# Patient Record
Sex: MALE | Race: WHITE | NOT HISPANIC OR LATINO | Employment: OTHER | URBAN - METROPOLITAN AREA
[De-identification: names, ages, dates, MRNs, and addresses within clinical notes are randomized per-mention and may not be internally consistent; named-entity substitution may affect disease eponyms.]

---

## 2023-11-03 PROBLEM — L03.114 CELLULITIS OF HAND, LEFT: Status: ACTIVE | Noted: 2023-11-03

## 2023-11-03 PROBLEM — R94.120 ABNORMALLY COMPLIANT RIGHT MIDDLE EAR SYSTEM WITH TYPE AD TYMPANOGRAM CURVE: Status: ACTIVE | Noted: 2023-11-03

## 2023-11-06 ENCOUNTER — OFFICE VISIT (OUTPATIENT)
Dept: ORTHOPEDIC SURGERY | Facility: CLINIC | Age: 83
End: 2023-11-06
Payer: MEDICARE

## 2023-11-06 DIAGNOSIS — G56.01 CARPAL TUNNEL SYNDROME ON RIGHT: Primary | ICD-10-CM

## 2023-11-06 PROCEDURE — 99213 OFFICE O/P EST LOW 20 MIN: CPT | Performed by: ORTHOPAEDIC SURGERY

## 2023-11-06 PROCEDURE — 1036F TOBACCO NON-USER: CPT | Performed by: ORTHOPAEDIC SURGERY

## 2023-11-06 PROCEDURE — 1159F MED LIST DOCD IN RCRD: CPT | Performed by: ORTHOPAEDIC SURGERY

## 2023-11-06 PROCEDURE — 1126F AMNT PAIN NOTED NONE PRSNT: CPT | Performed by: ORTHOPAEDIC SURGERY

## 2023-11-06 PROCEDURE — 1160F RVW MEDS BY RX/DR IN RCRD: CPT | Performed by: ORTHOPAEDIC SURGERY

## 2023-11-06 RX ORDER — SODIUM CHLORIDE, SODIUM LACTATE, POTASSIUM CHLORIDE, CALCIUM CHLORIDE 600; 310; 30; 20 MG/100ML; MG/100ML; MG/100ML; MG/100ML
100 INJECTION, SOLUTION INTRAVENOUS CONTINUOUS
Status: CANCELLED | OUTPATIENT
Start: 2023-11-06

## 2023-11-06 ASSESSMENT — PAIN - FUNCTIONAL ASSESSMENT: PAIN_FUNCTIONAL_ASSESSMENT: 0-10

## 2023-11-06 ASSESSMENT — PAIN SCALES - GENERAL: PAINLEVEL_OUTOF10: 0 - NO PAIN

## 2023-11-06 ASSESSMENT — ENCOUNTER SYMPTOMS
WHEEZING: 0
SINUS PAIN: 1
FATIGUE: 0
FEVER: 0
CHILLS: 0
SHORTNESS OF BREATH: 0

## 2023-11-06 NOTE — PROGRESS NOTES
Reason for Appointment  Right hand numbness and pain     History of Present Illness  Patient is a 83 y.o. male here today for follow-up evaluation of increased right wrist and hand pain and numbness.  He has a history of a previous left carpal tunnel release earlier this year, this did help him with pain and tingling up the arm, he still does have numbness in the fingertips and he does understand he can continue to see improvement for over a year postoperatively.  He has similar symptoms in the right hand, numbness in the fingertips and pain that radiates up the arm especially at night.  He has had previous carpal tunnel injections that have given him good relief and he understands this is the best prognostic indicator that surgery would help him.  He would like to discuss right carpal tunnel release today.  No other changes in his past medical history, allergies, or medications.    Past Medical History:   Diagnosis Date    Headache, unspecified 10/19/2015    Sinus headache    Other specified disorders of nose and nasal sinuses 09/14/2016    Nasal vestibulitis    Personal history of other diseases of the respiratory system 10/19/2015    History of acute sinusitis       History reviewed. No pertinent surgical history.    Medication Documentation Review Audit       Reviewed by Ivana Isbell PA-C (Physician Assistant) on 11/06/23 at 0918      Medication Order Taking? Sig Documenting Provider Last Dose Status   atorvastatin calcium (ATORVASTATIN ORAL) [id number]  Atorvastatin Calcium Historical Provider, MD  Active   LISINOPRIL ORAL [id number]  Lisinopril Historical Provider, MD  Active                    Allergies   Allergen Reactions    Penicillins Itching and Unknown       Review of Systems   Constitutional:  Negative for chills, fatigue and fever.   HENT:  Positive for sinus pain. Negative for tinnitus.    Respiratory:  Negative for shortness of breath and wheezing.    Cardiovascular:  Negative for chest pain and  leg swelling.     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  Minimally positive Tinel's over the right median nerve, well-healed previous carpal tunnel scar on the left.  Good digital motion today with no triggering.  Good pulses and decreased sensation to light touch in the median nerve distribution, good capillary refill in the upper extremities.    Assessment   Encounter Diagnosis   Name Primary?    Carpal tunnel syndrome on right Yes       Plan   We discussed a right carpal tunnel release today, he has classic symptoms, he has had previous injections that have given him short-term relief and he does have a wrist brace that he wears at night.  He has seen improvement with a previous left carpal tunnel release.  He understands the risks of surgery including nerve, artery, tendon damage, infection, continued pain, and need for future surgery.  He will call and schedule a right carpal tunnel release at his convenience    Written by Ivana Reis saw, evaluated, and treated the patient with the PA

## 2023-11-07 ENCOUNTER — TELEPHONE (OUTPATIENT)
Dept: ORTHOPEDIC SURGERY | Facility: CLINIC | Age: 83
End: 2023-11-07
Payer: MEDICARE

## 2023-11-09 PROBLEM — G56.01 CARPAL TUNNEL SYNDROME ON RIGHT: Status: ACTIVE | Noted: 2023-11-06

## 2023-11-22 ENCOUNTER — APPOINTMENT (OUTPATIENT)
Dept: PREADMISSION TESTING | Facility: HOSPITAL | Age: 83
End: 2023-11-22
Payer: MEDICARE

## 2023-11-24 ENCOUNTER — APPOINTMENT (OUTPATIENT)
Dept: PREADMISSION TESTING | Facility: HOSPITAL | Age: 83
End: 2023-11-24
Payer: MEDICARE

## 2023-11-27 ENCOUNTER — ANESTHESIA EVENT (OUTPATIENT)
Dept: OPERATING ROOM | Facility: HOSPITAL | Age: 83
End: 2023-11-27
Payer: MEDICARE

## 2023-11-27 ENCOUNTER — PRE-ADMISSION TESTING (OUTPATIENT)
Dept: PREADMISSION TESTING | Facility: HOSPITAL | Age: 83
End: 2023-11-27
Payer: MEDICARE

## 2023-11-27 VITALS
TEMPERATURE: 97.9 F | DIASTOLIC BLOOD PRESSURE: 61 MMHG | WEIGHT: 194.45 LBS | RESPIRATION RATE: 16 BRPM | HEART RATE: 76 BPM | BODY MASS INDEX: 29.47 KG/M2 | SYSTOLIC BLOOD PRESSURE: 128 MMHG | HEIGHT: 68 IN | OXYGEN SATURATION: 95 %

## 2023-11-27 RX ORDER — IBUPROFEN 200 MG
400 TABLET ORAL EVERY 6 HOURS PRN
COMMUNITY

## 2023-11-27 RX ORDER — LISINOPRIL AND HYDROCHLOROTHIAZIDE 10; 12.5 MG/1; MG/1
1 TABLET ORAL 2 TIMES DAILY
COMMUNITY

## 2023-11-27 RX ORDER — ATORVASTATIN CALCIUM 80 MG/1
80 TABLET, FILM COATED ORAL DAILY
COMMUNITY

## 2023-11-27 RX ORDER — BISMUTH SUBSALICYLATE 262 MG
1 TABLET,CHEWABLE ORAL DAILY
COMMUNITY

## 2023-11-27 ASSESSMENT — ENCOUNTER SYMPTOMS: NUMBNESS: 1

## 2023-11-27 ASSESSMENT — PAIN - FUNCTIONAL ASSESSMENT: PAIN_FUNCTIONAL_ASSESSMENT: 0-10

## 2023-11-27 ASSESSMENT — PAIN SCALES - GENERAL: PAINLEVEL_OUTOF10: 0 - NO PAIN

## 2023-11-27 NOTE — H&P (VIEW-ONLY)
CPM/PAT Evaluation     Johnny Hunt is a 83 y.o. male   Chief Complaint: carpal tunnel    HPI:  Patient is an 84 y/o alert and oriented male coming in for PAT for a scheduled Right Decompression Median Nerve w/ carpal tunnel release on 11/29/23 w/ Dr. Reis.  The patient reports numbness and tingling in his right hand.  Denies any weakness in his hand but states he does drop objects at times.  Reports he had left carpal tunnel release in the past.  Patient denies chest pain, SOB, VIRAMONTES and NVDC.  Patient also denies Hx: DVT/PE.  Current medications were reviewed and a presurgical mediation schedule was provided.  He has no questions at this time.   Past Medical History:   Diagnosis Date    Headache, unspecified 10/19/2015    Sinus headache    Other specified disorders of nose and nasal sinuses 09/14/2016    Nasal vestibulitis    Personal history of other diseases of the respiratory system 10/19/2015    History of acute sinusitis      History reviewed. No pertinent surgical history.     Allergies   Allergen Reactions    Penicillins Itching and Unknown        Current Outpatient Medications on File Prior to Visit   Medication Sig Dispense Refill    atorvastatin (Lipitor) 80 mg tablet Take 1 tablet (80 mg) by mouth once daily.      ibuprofen 200 mg tablet Take 2 tablets (400 mg) by mouth every 6 hours if needed for mild pain (1 - 3).      lisinopriL-hydrochlorothiazide 10-12.5 mg tablet Take 1 tablet by mouth 2 times a day.      multivitamin tablet Take 1 tablet by mouth once daily.      LISINOPRIL ORAL Lisinopril      [DISCONTINUED] atorvastatin calcium (ATORVASTATIN ORAL) Atorvastatin Calcium       No current facility-administered medications on file prior to visit.       Vitals:    11/27/23 1503   BP: 128/61   Pulse: 76   Resp: 16   Temp: 36.6 °C (97.9 °F)   SpO2: 95%       Review of Systems   Neurological:  Positive for numbness.        See hpi for details.    All other systems reviewed and are negative.      Physical Exam  Vitals and nursing note reviewed.   Constitutional:       Appearance: Normal appearance.   HENT:      Head: Normocephalic and atraumatic.      Mouth/Throat:      Mouth: Mucous membranes are moist.      Pharynx: Oropharynx is clear.   Eyes:      Pupils: Pupils are equal, round, and reactive to light.   Cardiovascular:      Rate and Rhythm: Normal rate and regular rhythm.      Heart sounds: Normal heart sounds.      Comments: EKG today is NSR rate of 73  Pulmonary:      Effort: Pulmonary effort is normal.      Breath sounds: Normal breath sounds.   Musculoskeletal:         General: Normal range of motion.      Cervical back: Normal range of motion and neck supple.   Skin:     General: Skin is warm and dry.   Neurological:      Mental Status: He is alert and oriented to person, place, and time.   Psychiatric:         Mood and Affect: Mood normal.         Behavior: Behavior normal.         Thought Content: Thought content normal.         Judgment: Judgment normal.        PAT AIRWAY:   Airway:     Mallampati::  I    TM distance::  >3 FB    Neck ROM::  Full  Has 2 missing teeth     Assessment and Plan:   Carpal Tunnel Syndrome on Right  Right Decompression Median Nerve w/ Carpal Tunnel Release  Managed with motrin prn    Hypertension  Managed with lisinopril-hydrochlorothiazide 10-12.5mg daily    ASA II  RCRI - 0 points  Class I Risk 3.9%  NAZIA -5  points high  Risk for RYNE   NSQIP - Predicted length of stay0  days  ARISCAT - 24 points Low Risk 1.6%  DASI 42.7 Points 7.99 Mets  [place] - 0.2%  JHFRAT - 6 points low risk for falls  Clearance - not indicated  PAT Testing -EKG  CBC and CMP on 10/16/23 stable     Face to Face patient contact time 20 minutes    GRIFFIN Chadwick-CNP 11/27/2023 3:12 PM

## 2023-11-27 NOTE — PREPROCEDURE INSTRUCTIONS
Medication List            Accurate as of November 27, 2023  3:15 PM. Always use your most recent med list.                atorvastatin 80 mg tablet  Commonly known as: Lipitor  Medication Adjustments for Surgery: Take morning of surgery with sip of water, no other fluids     ibuprofen 200 mg tablet  Medication Adjustments for Surgery: Other (Comment)  Notes to patient: Stop 5 days prior to surgery     lisinopriL-hydrochlorothiazide 10-12.5 mg tablet  Medication Adjustments for Surgery: Stop 1 day before surgery     multivitamin tablet  Medication Adjustments for Surgery: Stop 7 days before surgery                              NPO Instructions:    Do not eat any food after midnight the night before your surgery/procedure.    Additional Instructions:     Seven/Six Days before Surgery:  Review your medication instructions, stop indicated medications  Five Days before Surgery:  Review your medication instructions, stop indicated medications  Three Days before Surgery:  Review your medication instructions, stop indicated medications  The Day before Surgery:  Review your medication instructions, stop indicated medications  You will be contacted regarding the time of your arrival to facility and surgery time  Do not eat any food after Midnight  Day of Surgery:  Review your medication instructions, take indicated medications  Wear  comfortable loose fitting clothing  Do not use moisturizers, creams, lotions or perfume  All jewelry and valuables should be left at home

## 2023-11-27 NOTE — CPM/PAT H&P
CPM/PAT Evaluation     Johnny Hunt is a 83 y.o. male   Chief Complaint: carpal tunnel    HPI:  Patient is an 82 y/o alert and oriented male coming in for PAT for a scheduled Right Decompression Median Nerve w/ carpal tunnel release on 11/29/23 w/ Dr. Reis.  The patient reports numbness and tingling in his right hand.  Denies any weakness in his hand but states he does drop objects at times.  Reports he had left carpal tunnel release in the past.  Patient denies chest pain, SOB, VIRAMONTES and NVDC.  Patient also denies Hx: DVT/PE.  Current medications were reviewed and a presurgical mediation schedule was provided.  He has no questions at this time.   Past Medical History:   Diagnosis Date    Headache, unspecified 10/19/2015    Sinus headache    Other specified disorders of nose and nasal sinuses 09/14/2016    Nasal vestibulitis    Personal history of other diseases of the respiratory system 10/19/2015    History of acute sinusitis      History reviewed. No pertinent surgical history.     Allergies   Allergen Reactions    Penicillins Itching and Unknown        Current Outpatient Medications on File Prior to Visit   Medication Sig Dispense Refill    atorvastatin (Lipitor) 80 mg tablet Take 1 tablet (80 mg) by mouth once daily.      ibuprofen 200 mg tablet Take 2 tablets (400 mg) by mouth every 6 hours if needed for mild pain (1 - 3).      lisinopriL-hydrochlorothiazide 10-12.5 mg tablet Take 1 tablet by mouth 2 times a day.      multivitamin tablet Take 1 tablet by mouth once daily.      LISINOPRIL ORAL Lisinopril      [DISCONTINUED] atorvastatin calcium (ATORVASTATIN ORAL) Atorvastatin Calcium       No current facility-administered medications on file prior to visit.       Vitals:    11/27/23 1503   BP: 128/61   Pulse: 76   Resp: 16   Temp: 36.6 °C (97.9 °F)   SpO2: 95%       Review of Systems   Neurological:  Positive for numbness.        See hpi for details.    All other systems reviewed and are negative.      Physical Exam  Vitals and nursing note reviewed.   Constitutional:       Appearance: Normal appearance.   HENT:      Head: Normocephalic and atraumatic.      Mouth/Throat:      Mouth: Mucous membranes are moist.      Pharynx: Oropharynx is clear.   Eyes:      Pupils: Pupils are equal, round, and reactive to light.   Cardiovascular:      Rate and Rhythm: Normal rate and regular rhythm.      Heart sounds: Normal heart sounds.      Comments: EKG today is NSR rate of 73  Pulmonary:      Effort: Pulmonary effort is normal.      Breath sounds: Normal breath sounds.   Musculoskeletal:         General: Normal range of motion.      Cervical back: Normal range of motion and neck supple.   Skin:     General: Skin is warm and dry.   Neurological:      Mental Status: He is alert and oriented to person, place, and time.   Psychiatric:         Mood and Affect: Mood normal.         Behavior: Behavior normal.         Thought Content: Thought content normal.         Judgment: Judgment normal.        PAT AIRWAY:   Airway:     Mallampati::  I    TM distance::  >3 FB    Neck ROM::  Full  Has 2 missing teeth     Assessment and Plan:   Carpal Tunnel Syndrome on Right  Right Decompression Median Nerve w/ Carpal Tunnel Release  Managed with motrin prn    Hypertension  Managed with lisinopril-hydrochlorothiazide 10-12.5mg daily    ASA II  RCRI - 0 points  Class I Risk 3.9%  NAZIA -5  points high  Risk for RYNE   NSQIP - Predicted length of stay0  days  ARISCAT - 24 points Low Risk 1.6%  DASI 42.7 Points 7.99 Mets  [place] - 0.2%  JHFRAT - 6 points low risk for falls  Clearance - not indicated  PAT Testing -EKG  CBC and CMP on 10/16/23 stable     Face to Face patient contact time 20 minutes    GRIFFIN Chadwick-CNP 11/27/2023 3:12 PM

## 2023-11-29 ENCOUNTER — ANESTHESIA (OUTPATIENT)
Dept: OPERATING ROOM | Facility: HOSPITAL | Age: 83
End: 2023-11-29
Payer: MEDICARE

## 2023-11-29 ENCOUNTER — HOSPITAL ENCOUNTER (OUTPATIENT)
Facility: HOSPITAL | Age: 83
Setting detail: OUTPATIENT SURGERY
Discharge: HOME | End: 2023-11-29
Attending: ORTHOPAEDIC SURGERY | Admitting: ORTHOPAEDIC SURGERY
Payer: MEDICARE

## 2023-11-29 VITALS
WEIGHT: 193.78 LBS | SYSTOLIC BLOOD PRESSURE: 137 MMHG | DIASTOLIC BLOOD PRESSURE: 61 MMHG | TEMPERATURE: 97.3 F | HEART RATE: 61 BPM | RESPIRATION RATE: 18 BRPM | BODY MASS INDEX: 29.37 KG/M2 | OXYGEN SATURATION: 94 % | HEIGHT: 68 IN

## 2023-11-29 DIAGNOSIS — G56.01 CARPAL TUNNEL SYNDROME ON RIGHT: Primary | ICD-10-CM

## 2023-11-29 PROBLEM — E78.5 HYPERLIPIDEMIA: Status: ACTIVE | Noted: 2023-11-29

## 2023-11-29 PROBLEM — I10 HTN (HYPERTENSION): Status: ACTIVE | Noted: 2023-11-29

## 2023-11-29 PROCEDURE — A64721 PR REVISE MEDIAN N/CARPAL TUNNEL SURG: Performed by: ANESTHESIOLOGIST ASSISTANT

## 2023-11-29 PROCEDURE — 3600000003 HC OR TIME - INITIAL BASE CHARGE - PROCEDURE LEVEL THREE: Performed by: ORTHOPAEDIC SURGERY

## 2023-11-29 PROCEDURE — 7100000009 HC PHASE TWO TIME - INITIAL BASE CHARGE: Performed by: ORTHOPAEDIC SURGERY

## 2023-11-29 PROCEDURE — 2500000004 HC RX 250 GENERAL PHARMACY W/ HCPCS (ALT 636 FOR OP/ED): Performed by: ANESTHESIOLOGIST ASSISTANT

## 2023-11-29 PROCEDURE — 3700000002 HC GENERAL ANESTHESIA TIME - EACH INCREMENTAL 1 MINUTE: Performed by: ORTHOPAEDIC SURGERY

## 2023-11-29 PROCEDURE — 2500000005 HC RX 250 GENERAL PHARMACY W/O HCPCS: Performed by: ORTHOPAEDIC SURGERY

## 2023-11-29 PROCEDURE — 3600000008 HC OR TIME - EACH INCREMENTAL 1 MINUTE - PROCEDURE LEVEL THREE: Performed by: ORTHOPAEDIC SURGERY

## 2023-11-29 PROCEDURE — 2500000004 HC RX 250 GENERAL PHARMACY W/ HCPCS (ALT 636 FOR OP/ED): Performed by: PHYSICIAN ASSISTANT

## 2023-11-29 PROCEDURE — 7100000001 HC RECOVERY ROOM TIME - INITIAL BASE CHARGE: Performed by: ORTHOPAEDIC SURGERY

## 2023-11-29 PROCEDURE — 7100000010 HC PHASE TWO TIME - EACH INCREMENTAL 1 MINUTE: Performed by: ORTHOPAEDIC SURGERY

## 2023-11-29 PROCEDURE — 2500000001 HC RX 250 WO HCPCS SELF ADMINISTERED DRUGS (ALT 637 FOR MEDICARE OP): Performed by: ORTHOPAEDIC SURGERY

## 2023-11-29 PROCEDURE — 99100 ANES PT EXTEME AGE<1 YR&>70: CPT | Performed by: ANESTHESIOLOGY

## 2023-11-29 PROCEDURE — 64721 CARPAL TUNNEL SURGERY: CPT | Performed by: ORTHOPAEDIC SURGERY

## 2023-11-29 PROCEDURE — A64721 PR REVISE MEDIAN N/CARPAL TUNNEL SURG: Performed by: ANESTHESIOLOGY

## 2023-11-29 PROCEDURE — 7100000002 HC RECOVERY ROOM TIME - EACH INCREMENTAL 1 MINUTE: Performed by: ORTHOPAEDIC SURGERY

## 2023-11-29 PROCEDURE — 3700000001 HC GENERAL ANESTHESIA TIME - INITIAL BASE CHARGE: Performed by: ORTHOPAEDIC SURGERY

## 2023-11-29 RX ORDER — ONDANSETRON HYDROCHLORIDE 2 MG/ML
4 INJECTION, SOLUTION INTRAVENOUS ONCE AS NEEDED
Status: DISCONTINUED | OUTPATIENT
Start: 2023-11-29 | End: 2023-11-29 | Stop reason: HOSPADM

## 2023-11-29 RX ORDER — SODIUM CHLORIDE, SODIUM LACTATE, POTASSIUM CHLORIDE, CALCIUM CHLORIDE 600; 310; 30; 20 MG/100ML; MG/100ML; MG/100ML; MG/100ML
100 INJECTION, SOLUTION INTRAVENOUS CONTINUOUS
Status: DISCONTINUED | OUTPATIENT
Start: 2023-11-29 | End: 2023-11-29 | Stop reason: HOSPADM

## 2023-11-29 RX ORDER — DOXYCYCLINE 100 MG/1
100 CAPSULE ORAL 2 TIMES DAILY
Qty: 14 CAPSULE | Refills: 0 | Status: SHIPPED | OUTPATIENT
Start: 2023-11-29 | End: 2023-12-06

## 2023-11-29 RX ORDER — PROPOFOL 10 MG/ML
INJECTION, EMULSION INTRAVENOUS AS NEEDED
Status: DISCONTINUED | OUTPATIENT
Start: 2023-11-29 | End: 2023-11-29

## 2023-11-29 RX ORDER — FENTANYL CITRATE 50 UG/ML
50 INJECTION, SOLUTION INTRAMUSCULAR; INTRAVENOUS EVERY 5 MIN PRN
Status: DISCONTINUED | OUTPATIENT
Start: 2023-11-29 | End: 2023-11-29 | Stop reason: HOSPADM

## 2023-11-29 RX ORDER — BACITRACIN 500 [USP'U]/G
OINTMENT TOPICAL AS NEEDED
Status: DISCONTINUED | OUTPATIENT
Start: 2023-11-29 | End: 2023-11-29 | Stop reason: HOSPADM

## 2023-11-29 RX ORDER — CEFAZOLIN SODIUM 2 G/100ML
2 INJECTION, SOLUTION INTRAVENOUS ONCE
Status: COMPLETED | OUTPATIENT
Start: 2023-11-29 | End: 2023-11-29

## 2023-11-29 RX ORDER — FENTANYL CITRATE 50 UG/ML
INJECTION, SOLUTION INTRAMUSCULAR; INTRAVENOUS AS NEEDED
Status: DISCONTINUED | OUTPATIENT
Start: 2023-11-29 | End: 2023-11-29

## 2023-11-29 RX ORDER — PROPOFOL 10 MG/ML
INJECTION, EMULSION INTRAVENOUS CONTINUOUS PRN
Status: DISCONTINUED | OUTPATIENT
Start: 2023-11-29 | End: 2023-11-29

## 2023-11-29 RX ORDER — BUPIVACAINE HYDROCHLORIDE 5 MG/ML
INJECTION, SOLUTION PERINEURAL AS NEEDED
Status: DISCONTINUED | OUTPATIENT
Start: 2023-11-29 | End: 2023-11-29 | Stop reason: HOSPADM

## 2023-11-29 RX ORDER — LIDOCAINE HYDROCHLORIDE 10 MG/ML
INJECTION INFILTRATION; PERINEURAL AS NEEDED
Status: DISCONTINUED | OUTPATIENT
Start: 2023-11-29 | End: 2023-11-29 | Stop reason: HOSPADM

## 2023-11-29 RX ORDER — ONDANSETRON HYDROCHLORIDE 2 MG/ML
INJECTION, SOLUTION INTRAVENOUS AS NEEDED
Status: DISCONTINUED | OUTPATIENT
Start: 2023-11-29 | End: 2023-11-29

## 2023-11-29 RX ADMIN — PROPOFOL 10 MG: 10 INJECTION, EMULSION INTRAVENOUS at 07:55

## 2023-11-29 RX ADMIN — FENTANYL CITRATE 25 MCG: 0.05 INJECTION, SOLUTION INTRAMUSCULAR; INTRAVENOUS at 07:45

## 2023-11-29 RX ADMIN — SODIUM CHLORIDE, SODIUM LACTATE, POTASSIUM CHLORIDE, AND CALCIUM CHLORIDE 100 ML/HR: 600; 310; 30; 20 INJECTION, SOLUTION INTRAVENOUS at 07:01

## 2023-11-29 RX ADMIN — CEFAZOLIN SODIUM 2 G: 2 INJECTION, SOLUTION INTRAVENOUS at 07:51

## 2023-11-29 RX ADMIN — ONDANSETRON 4 MG: 2 INJECTION INTRAMUSCULAR; INTRAVENOUS at 08:09

## 2023-11-29 RX ADMIN — PROPOFOL 75 MCG/KG/MIN: 10 INJECTION, EMULSION INTRAVENOUS at 07:55

## 2023-11-29 RX ADMIN — PROPOFOL 30 MG: 10 INJECTION, EMULSION INTRAVENOUS at 07:58

## 2023-11-29 RX ADMIN — PROPOFOL 30 MG: 10 INJECTION, EMULSION INTRAVENOUS at 07:54

## 2023-11-29 RX ADMIN — FENTANYL CITRATE 25 MCG: 0.05 INJECTION, SOLUTION INTRAMUSCULAR; INTRAVENOUS at 07:56

## 2023-11-29 SDOH — HEALTH STABILITY: MENTAL HEALTH: CURRENT SMOKER: 0

## 2023-11-29 ASSESSMENT — PAIN - FUNCTIONAL ASSESSMENT
PAIN_FUNCTIONAL_ASSESSMENT: 0-10
PAIN_FUNCTIONAL_ASSESSMENT: FLACC (FACE, LEGS, ACTIVITY, CRY, CONSOLABILITY)
PAIN_FUNCTIONAL_ASSESSMENT: 0-10

## 2023-11-29 ASSESSMENT — PAIN SCALES - GENERAL
PAINLEVEL_OUTOF10: 0 - NO PAIN
PAIN_LEVEL: 1
PAINLEVEL_OUTOF10: 0 - NO PAIN

## 2023-11-29 NOTE — PERIOPERATIVE NURSING NOTE
Pt arrived from pacu with anesthesia present. Pt has a simple face mask and is receiving supplemental o2 at 8L. Pt is asleep with no indication that he is experiencing pain. Vital signs wnl.

## 2023-11-29 NOTE — ANESTHESIA POSTPROCEDURE EVALUATION
Patient: Johnny Hunt    Procedure Summary       Date: 11/29/23 Room / Location: [facility] / [facility]    Anesthesia Start: 0746 Anesthesia Stop: 0815    Procedure: Decompression Median Nerve with Carpal Tunnel Release (Right: Arm Lower) Diagnosis:       Carpal tunnel syndrome on right      (Carpal tunnel syndrome on right [G56.01])    Surgeons: Mike Reis MD Responsible Provider: Nabil Castelan MD    Anesthesia Type: general, MAC ASA Status: 2            Anesthesia Type: general, MAC    Vitals Value Taken Time   /79 11/29/23 0815   Temp 36.4 °C (97.5 °F) 11/29/23 0815   Pulse 72 11/29/23 0830   Resp 18 11/29/23 0830   SpO2 94 % 11/29/23 0830       Anesthesia Post Evaluation    Patient location during evaluation: PACU  Patient participation: complete - patient participated  Level of consciousness: awake and alert  Pain score: 1  Pain management: satisfactory to patient  Airway patency: patent  Cardiovascular status: hemodynamically stable  Respiratory status: acceptable  Hydration status: acceptable  Postoperative Nausea and Vomiting: none  Comments: PONV absent        No notable events documented.

## 2023-11-29 NOTE — DISCHARGE INSTRUCTIONS
You had carpal tunnel surgery today, due to local anesthesia the hand may be numb for a few hours after surgery, this is very normal and should slowly wear off.    Some swelling and bruising in the forearm and palm is very normal after surgery.    Keep dressing on hand for 2-3 days, after that you may take dressing off and put a band-aid over wound.    DO NOT get wound wet for 5 days post-op, after that you can shower/wash hand and get wound wet, pat dry.    If able, take 800mg Ibuprofen and/or Tylenol for pain after surgery if needed.    Use the hand for activities as tolerated, goal is to regain full digital and wrist motion as quickly as possible but avoid heavy lifting until advised.    While some symptoms may improve quickly after surgery, some symptoms take time to slowly improve and the results can be very variable.    Follow up in the office by calling [phone] in 10-13 days for a post-op appt

## 2023-11-29 NOTE — OP NOTE
Decompression Median Nerve with Carpal Tunnel Release (R) Operative Note     Date: 2023  OR Location: [facility]    Name: Johnny Hunt, : 1940, Age: 83 y.o., MRN: [id number], Sex: male    Diagnosis  Pre-op Diagnosis     * Carpal tunnel syndrome on right [G56.01] Post-op Diagnosis     * Carpal tunnel syndrome on right [G56.01]     Procedures  Decompression Median Nerve with Carpal Tunnel Release  02344 - SD NEUROPLASTY &/TRANSPOS MEDIAN NRV CARPAL TUNNE    Right open carpal tunnel release  Surgeons      * Mike Reis - Primary    Resident/Fellow/Other Assistant:  Surgeon(s) and Role:    Procedure Summary  Anesthesia: Monitor Anesthesia Care  ASA: II  Anesthesia Staff: Anesthesiologist: Nabil Castelan MD  C-AA: ARVIND Blackman  Estimated Blood Loss: 0mL  Intra-op Medications:   Medication Name Total Dose   lidocaine (Xylocaine) 10 mg/mL (1 %) injection 5 mL   BUPivacaine HCl (Marcaine) 0.5 % (5 mg/mL) injection 5 mL   bacitracin ointment 1 Application              Anesthesia Record               Intraprocedure I/O Totals          Intake    Propofol Drip 0.00 mL    The total shown is the total volume documented since Anesthesia Start was filed.    Total Intake 0 mL          Specimen: No specimens collected     Staff:   Circulator: Keysah Wong RN  Scrub Person: Kristel Kee PA-C; Dulce Malagon         Drains and/or Catheters: * None in log *    Tourniquet Times:   * Missing tourniquet times found for documented tourniquets in lo *     Implants:     Findings: Moderate nerve swelling with some moderate synovitis    Indications: Johnny Hunt is an 83 y.o. male who is having surgery for Carpal tunnel syndrome on right [G56.01].  Pleasant patient understands risk benefits alternatives as discussed above and the variable results especially advancing age and even worsening of symptoms.  Wish to proceed.  Understands recovery answered all preoperative questions informed consent  obtained    The patient was seen in the preoperative area. The risks, benefits, complications, treatment options, non-operative alternatives, expected recovery and outcomes were discussed with the patient. The possibilities of reaction to medication, pulmonary aspiration, injury to surrounding structures, bleeding, recurrent infection, the need for additional procedures, failure to diagnose a condition, and creating a complication requiring transfusion or operation were discussed with the patient. The patient concurred with the proposed plan, giving informed consent.  The site of surgery was properly noted/marked if necessary per policy. The patient has been actively warmed in preoperative area. Preoperative antibiotics have been ordered and given within 1 hours of incision. Venous thrombosis prophylaxis have been ordered including bilateral sequential compression devices    Procedure Details: Pleasant patient brought the operating room after sterilely prepping draping form a timeout we placed abundant local made a 3 cm incision in line with the radial aspect the right ring finger over the transverse carpal ligament.  Went down through skin bluntly spreading onto the ligament and opened up the ligament sharply along its ulnar most aspects and released the proximal and distal aspects to the superficial fat and proximally with greater than 4 cm of forearm fascia the nerve was significantly swollen there was some mild superficial synovitis that was released otherwise no abnormalities copiously irrigated closed wound light compressive dressing was placed fingers pinked up nicely patient understands postoperative course and wound care.  Complications:  None; patient tolerated the procedure well.    Disposition: PACU - hemodynamically stable.  Condition: stable         Additional Details: 0    Attending Attestation: I performed the procedure.    Mike Reis  Phone Number: [phone]

## 2023-11-29 NOTE — ANESTHESIA PREPROCEDURE EVALUATION
Patient: Johnny Hunt    Procedure Information       Date/Time: 11/29/23 0800    Procedure: Decompression Median Nerve with Carpal Tunnel Release (Right: Arm Lower)    Location: [facility] / [facility]    Surgeons: Mike Reis MD            Relevant Problems   Cardiovascular   (+) HTN (hypertension)   (+) Hyperlipidemia      Neuro/Psych   (+) Carpal tunnel syndrome on right      Musculoskeletal   (+) Carpal tunnel syndrome on right   No past surgical history on file.  Carpal tunnel Left    Clinical information reviewed:    Allergies  Meds               NPO Detail:  NPO/Void Status  Date of Last Liquid: 11/28/23  Time of Last Liquid: 1700  Date of Last Solid: 11/28/23  Time of Last Solid: 1700  Time of Last Void: 0500         Physical Exam    Airway  Mallampati: I  TM distance: >3 FB  Neck ROM: full     Cardiovascular   Comments: deferred   Dental    Pulmonary   Comments: deferred   Abdominal     Comments: deferred           Anesthesia Plan    ASA 2     general and MAC     The patient is not a current smoker.    intravenous induction   Postoperative administration of opioids is intended.  Anesthetic plan and risks discussed with patient.    Plan discussed with CAA.

## 2023-11-29 NOTE — PERIOPERATIVE NURSING NOTE
Patient in Phase 2; dressed and up to chair with RN assist. Tolerating po fluids, no complaint of pain and no complaint of nausea.     Significant other at bedside; discussed discharge instructions with patient and Significant other. All questions at this time answered.     Patient clinically appropriate for discharge. IV removed and patient transported to discharge area via wheelchair.

## 2023-12-01 ASSESSMENT — PAIN SCALES - GENERAL: PAINLEVEL_OUTOF10: 0 - NO PAIN

## 2023-12-12 ENCOUNTER — OFFICE VISIT (OUTPATIENT)
Dept: ORTHOPEDIC SURGERY | Facility: CLINIC | Age: 83
End: 2023-12-12
Payer: MEDICARE

## 2023-12-12 DIAGNOSIS — G56.01 CARPAL TUNNEL SYNDROME ON RIGHT: Primary | ICD-10-CM

## 2023-12-12 PROCEDURE — 1160F RVW MEDS BY RX/DR IN RCRD: CPT | Performed by: ORTHOPAEDIC SURGERY

## 2023-12-12 PROCEDURE — 1036F TOBACCO NON-USER: CPT | Performed by: ORTHOPAEDIC SURGERY

## 2023-12-12 PROCEDURE — 1126F AMNT PAIN NOTED NONE PRSNT: CPT | Performed by: ORTHOPAEDIC SURGERY

## 2023-12-12 PROCEDURE — 99024 POSTOP FOLLOW-UP VISIT: CPT | Performed by: ORTHOPAEDIC SURGERY

## 2023-12-12 PROCEDURE — 1159F MED LIST DOCD IN RCRD: CPT | Performed by: ORTHOPAEDIC SURGERY

## 2023-12-12 ASSESSMENT — PAIN SCALES - GENERAL: PAINLEVEL_OUTOF10: 0 - NO PAIN

## 2023-12-12 ASSESSMENT — PAIN - FUNCTIONAL ASSESSMENT: PAIN_FUNCTIONAL_ASSESSMENT: 0-10

## 2023-12-12 NOTE — PROGRESS NOTES
Reason for Appointment  Chief Complaint   Patient presents with    Right Wrist - Post-op     History of Present Illness  Patient is here today 2 weeks s/p right carpal tunnel release 11/29/23. Patient is doing well overall. Sutures were removed today. Patient was given a gel brace and instructed on wound care. No issues. Already has improvement in symptoms. Will follow-up as needed.     Assessment   Encounter Diagnosis   Name Primary?    Carpal tunnel syndrome on right Yes       I, Laila Boyd, attest that this documentation has been prepared under the direction and in the presence of Mike Reis MD. By signing below, I, Mike Reis MD, personally performed the services described in this documentation. All medical record entries made by the scribe were at my direction and in my presence. I have reviewed the chart and agree that the record reflects my personal performance and is accurate and complete. 12/12/23

## 2025-02-10 ENCOUNTER — HOSPITAL ENCOUNTER (OUTPATIENT)
Dept: RADIOLOGY | Facility: CLINIC | Age: 85
Discharge: HOME | End: 2025-02-10
Payer: MEDICARE

## 2025-02-10 DIAGNOSIS — M25.512 LEFT SHOULDER PAIN, UNSPECIFIED CHRONICITY: ICD-10-CM

## 2025-02-10 PROCEDURE — 73030 X-RAY EXAM OF SHOULDER: CPT | Mod: LT

## 2025-02-10 PROCEDURE — 73030 X-RAY EXAM OF SHOULDER: CPT | Mod: LEFT SIDE | Performed by: RADIOLOGY

## 2025-02-11 ENCOUNTER — OFFICE VISIT (OUTPATIENT)
Dept: ORTHOPEDIC SURGERY | Facility: CLINIC | Age: 85
End: 2025-02-11
Payer: MEDICARE

## 2025-02-11 DIAGNOSIS — M75.102 TEAR OF LEFT ROTATOR CUFF, UNSPECIFIED TEAR EXTENT, UNSPECIFIED WHETHER TRAUMATIC: Primary | ICD-10-CM

## 2025-02-11 DIAGNOSIS — M25.512 LEFT SHOULDER PAIN, UNSPECIFIED CHRONICITY: ICD-10-CM

## 2025-02-11 PROCEDURE — 1036F TOBACCO NON-USER: CPT | Performed by: ORTHOPAEDIC SURGERY

## 2025-02-11 PROCEDURE — 2500000004 HC RX 250 GENERAL PHARMACY W/ HCPCS (ALT 636 FOR OP/ED): Performed by: ORTHOPAEDIC SURGERY

## 2025-02-11 PROCEDURE — 20611 DRAIN/INJ JOINT/BURSA W/US: CPT | Mod: LT | Performed by: ORTHOPAEDIC SURGERY

## 2025-02-11 PROCEDURE — 1160F RVW MEDS BY RX/DR IN RCRD: CPT | Performed by: ORTHOPAEDIC SURGERY

## 2025-02-11 PROCEDURE — 99213 OFFICE O/P EST LOW 20 MIN: CPT | Mod: 25 | Performed by: ORTHOPAEDIC SURGERY

## 2025-02-11 PROCEDURE — 1159F MED LIST DOCD IN RCRD: CPT | Performed by: ORTHOPAEDIC SURGERY

## 2025-02-11 PROCEDURE — 99213 OFFICE O/P EST LOW 20 MIN: CPT | Performed by: ORTHOPAEDIC SURGERY

## 2025-02-11 RX ADMIN — LIDOCAINE HYDROCHLORIDE 3 ML: 10 INJECTION, SOLUTION INFILTRATION; PERINEURAL at 09:34

## 2025-02-11 RX ADMIN — BETAMETHASONE DIPROPIONATE 40 MG: 0.5 OINTMENT TOPICAL at 09:34

## 2025-02-11 ASSESSMENT — ENCOUNTER SYMPTOMS
SHORTNESS OF BREATH: 0
ARTHRALGIAS: 1
SINUS PAIN: 0
FATIGUE: 0
CHILLS: 0
FEVER: 0
SORE THROAT: 0
JOINT SWELLING: 0
WOUND: 0
WHEEZING: 0

## 2025-02-11 ASSESSMENT — PAIN - FUNCTIONAL ASSESSMENT: PAIN_FUNCTIONAL_ASSESSMENT: 0-10

## 2025-02-11 ASSESSMENT — PAIN SCALES - GENERAL: PAINLEVEL_OUTOF10: 5 - MODERATE PAIN

## 2025-02-11 NOTE — PROGRESS NOTES
Reason for Appointment  Chief Complaint   Patient presents with    Left Shoulder - Pain     History of Present Illness  Patient is a 85 y.o. male here today for a new problem of left shoulder pain.  We have seen him in the past for a right carpal tunnel release.  He had a fall a few weeks ago and since that time has had increased pain and significant weakness, difficult to raise the arm up overhead.  X-rays taken today of the shoulder are reviewed and do show rotator cuff anchors in the humeral head with early glenohumeral joint arthritis.  No numbness or tingling down the arm.  No other changes in his past medical history, allergies, or medications.    Past Medical History:   Diagnosis Date    Headache, unspecified 10/19/2015    Sinus headache    Hyperlipidemia     Hypertension     Other specified disorders of nose and nasal sinuses 09/14/2016    Nasal vestibulitis    Personal history of other diseases of the respiratory system 10/19/2015    History of acute sinusitis       History reviewed. No pertinent surgical history.    Medication Documentation Review Audit       Reviewed by Adrienne Hdez MA (Medical Assistant) on 02/11/25 at 0850      Medication Order Taking? Sig Documenting Provider Last Dose Status   atorvastatin (Lipitor) 80 mg tablet [id number] Yes Take 1 tablet (80 mg) by mouth once daily. Historical Provider, MD Taking Active   ibuprofen 200 mg tablet [id number] Yes Take 2 tablets (400 mg) by mouth every 6 hours if needed for mild pain (1 - 3). Historical Provider, MD Taking Active   lisinopriL-hydrochlorothiazide 10-12.5 mg tablet [id number] Yes Take 1 tablet by mouth 2 times a day. Historical Provider, MD Taking Active   multivitamin tablet [id number] Yes Take 1 tablet by mouth once daily. Historical Provider, MD Taking Active                    Allergies   Allergen Reactions    Penicillins Itching and Unknown       Review of Systems   Constitutional:  Negative for chills, fatigue and fever.   HENT:   Negative for nosebleeds, sinus pain and sore throat.    Respiratory:  Negative for shortness of breath and wheezing.    Cardiovascular:  Negative for chest pain and leg swelling.   Musculoskeletal:  Positive for arthralgias. Negative for joint swelling.   Skin:  Negative for pallor and wound.     Exam   On exam the left shoulder shows poor active forward flexion up to only about 50 degrees today.  He has weakness with resisted external rotation and markedly positive impingement signs.  Deltoid is functional.  Good pulses and sensation in the upper extremity.    Assessment   Encounter Diagnoses   Name Primary?    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  At his age, we would like to avoid any further surgical intervention if possible.  We discussed a simple steroid injection to see how much improvement this gives him in terms of pain and function.  We sterilely injected under ultrasound guidance Kenalog and lidocaine into the left shoulder subacromial space.  Patient understands the small risk of infection and the signs to look for as well as flare action.  Hopefully this gives him good relief he can follow-up with us as needed..     I, Ivana Isbell PA-C, am acting as a scribe and attest that this documentation has been prepared under the direction and in the presence of Mike Reis MD.        Patient ID: Johnny Hunt is a 85 y.o. male.    L Inj/Asp: L subacromial bursa on 2/11/2025 9:34 AM  Indications: pain  Details: 22 G needle, ultrasound-guided  Medications: 40 mg triamcinolone acetonide 40 mg/mL; 3 mL lidocaine 10 mg/mL (1 %)  Outcome: tolerated well, no immediate complications    After discussing the risks and benefits of the procedure with proceeded with an injection.  Using ultrasound guidance we identified the acromion,  humeral head and the subacromial bursa, images obtained and saved. We then sterilely injected the left subacrominal space with a mixture of 40 mg of Kenalog and 1 cc of 1 % lidocaine. Pt tolerated the procedure well without any adverse reactions.    Procedure, treatment alternatives, risks and benefits explained, specific risks discussed. Consent was given by the patient. Immediately prior to procedure a time out was called to verify the correct patient, procedure, equipment, support staff and site/side marked as required. Patient was prepped and draped in the usual sterile fashion.             I, Aura Beck, attest that this documentation has been prepared under the direction and in the presence of Mike Reis MD.     By signing below, I, Mike Reis MD, personally performed the services described in this documentation. All medical record entries made by the scribe were at my direction and in my presence. I have reviewed the chart and agree that the record reflects my personal performance and is accurate and complete.

## 2025-02-12 RX ORDER — TRIAMCINOLONE ACETONIDE 40 MG/ML
40 INJECTION, SUSPENSION INTRA-ARTICULAR; INTRAMUSCULAR
Status: COMPLETED | OUTPATIENT
Start: 2025-02-11 | End: 2025-02-11

## 2025-02-12 RX ORDER — LIDOCAINE HYDROCHLORIDE 10 MG/ML
3 INJECTION, SOLUTION INFILTRATION; PERINEURAL
Status: COMPLETED | OUTPATIENT
Start: 2025-02-11 | End: 2025-02-11

## (undated) DEVICE — PADDING, UNDERCAST, WEBRIL, 2 IN X 4 YD, REG, NS

## (undated) DEVICE — SOLUTION, IRRIGATION, 0.9% SODIUM CHLORIDE, 1000 ML, HANG BOTTLE

## (undated) DEVICE — BANDAGE, ESMARK 4 IN X 9 FT, STERILE

## (undated) DEVICE — Device

## (undated) DEVICE — DRESSING, NON-ADHERENT, OIL EMULSION, CURITY, 3 X 16 IN, STERILE

## (undated) DEVICE — BANDAGE, ELASTIC, ACE, SELF-CLOSURE, 2 IN X 5 YD, STERILE

## (undated) DEVICE — TRAY, DRY PREP, PREMIUM

## (undated) DEVICE — GLOVES, SURG BIOGEL, SZ-7.5, PF, LF

## (undated) DEVICE — CUFF, TOURNIQUET, SINGLE BLADDER, SINGLE PORT, 18 IN

## (undated) DEVICE — GOWN, SURGICAL, SMARTGOWN, LARGE, STERILE

## (undated) DEVICE — DRESSING, GAUZE, SPONGE, KERLIX, SUPER, 6 X 6.75 IN, STERILE 10PK

## (undated) DEVICE — SOLUTION, CHLORHEXIDINE, 4%, 4OZ